# Patient Record
Sex: FEMALE | Race: WHITE | NOT HISPANIC OR LATINO | Employment: FULL TIME | URBAN - METROPOLITAN AREA
[De-identification: names, ages, dates, MRNs, and addresses within clinical notes are randomized per-mention and may not be internally consistent; named-entity substitution may affect disease eponyms.]

---

## 2017-08-26 ENCOUNTER — OFFICE VISIT (OUTPATIENT)
Dept: URGENT CARE | Facility: CLINIC | Age: 52
End: 2017-08-26
Payer: COMMERCIAL

## 2017-08-26 VITALS
DIASTOLIC BLOOD PRESSURE: 68 MMHG | TEMPERATURE: 97 F | RESPIRATION RATE: 18 BRPM | OXYGEN SATURATION: 98 % | HEIGHT: 67 IN | BODY MASS INDEX: 25.11 KG/M2 | WEIGHT: 160 LBS | HEART RATE: 90 BPM | SYSTOLIC BLOOD PRESSURE: 144 MMHG

## 2017-08-26 DIAGNOSIS — S99.922A FOOT INJURY, LEFT, INITIAL ENCOUNTER: Primary | ICD-10-CM

## 2017-08-26 PROCEDURE — 99203 OFFICE O/P NEW LOW 30 MIN: CPT | Mod: S$GLB,,, | Performed by: NURSE PRACTITIONER

## 2017-08-26 PROCEDURE — 3008F BODY MASS INDEX DOCD: CPT | Mod: S$GLB,,, | Performed by: NURSE PRACTITIONER

## 2017-08-26 NOTE — PATIENT INSTRUCTIONS
[facility] or [facility] for Post op/Cast shoe.    Crush Injury of the Foot, No Fracture  A crush injury to your foot causes local pain, swelling, and sometimes bruising. There are no broken bones. This injury takes from a few days to a few weeks to heal. If the toenail has been severely injured, it may fall off in 1 to 2 weeks. A new one will usually start to grow back within a month.  Home care  The following guidelines will help you care for your wound at home:  · You may be given a splint, cast, shoe, or boot to prevent movement at the injury. Unless you were told otherwise, use crutches or a walker and dont bear weight on the injured foot until cleared by your doctor to do so. (Crutches and walkers can be rented at many pharmacies and surgical/orthopedic supply stores). Dont put weight on a splint, or it will break.  · Keep your leg elevated to reduce pain and swelling. When sleeping, place a pillow under the injured leg. When sitting, support the injured leg so it is level with your waist. This is very important during the first 2 days (48 hours).  · Put an ice pack on the injured area. Do this for 20 minutes every 1 to 2 hours the first day for pain relief. You can make an ice pack by wrapping a plastic bag of ice cubes in a thin towel. As the ice melts, be careful that the splint/cast/boot/shoe doesnt get wet. Continue using the ice pack 3 to 4 times a day until the pain and swelling go away.  · You may use acetaminophen or ibuprofen to control pain, unless another pain medicine was prescribed. If you have chronic liver or kidney disease, talk with your health care provider before using these medicines. Also talk with your provider if youve had a stomach ulcer or GI bleeding.  · Keep the splint/cast/boot/shoe dry. When bathing, protect it with a large plastic bag, rubber-banded at the top end. If a fiberglass splint/cast or boot gets wet, you can dry it with a hair dryer. Unless told otherwise, you can  take off the boot or shoe to bathe.  · If your injury includes exposed cuts or scrapes, clean these daily with soap and water. Apply antibiotic ointment. Watch for the signs of infection listed below.  Follow-up care  Follow up with your doctor, or as advised. Return sooner if you dont start to get better within the next 3 days. If you were given a splint, it may be changed to a cast or boot at your follow-up visit.  X-rays will be checked by a radiologist. You will be told of any new findings that may affect your care.  When to seek medical advice  Call your health care provider right away if any of the following occur:  · The cast cracks  · The plaster cast or splint becomes wet or soft  · The fiberglass cast or splint remains wet for more than 24 hours  ·  Bad odor from the cast or wound-fluid stains the cast  · Increased tightness or pain under the cast or splint  · Toes become swollen, cold, blue, numb, or tingly  · Redness, warmth, swelling, drainage from the wound, or foul odor from a cast or splint  · You cant move your toes  · The skin around the cast becomes red  · Fever of 101ºF (38.3ºC) or higher, or as directed by your health care provider  Date Last Reviewed: 2/15/2015  © 7807-2519 [email or web address]. [address] [place] [address], [place] [address]. All rights reserved. This information is not intended as a substitute for professional medical care. Always follow your healthcare professional's instructions.

## 2017-08-26 NOTE — PROGRESS NOTES
"Subjective:       Patient ID: Nette Davidson is a 52 y.o. female.    Vitals:  height is 5' 7" (1.702 m) and weight is 72.6 kg (160 lb). Her temperature is 97.4 °F (36.3 °C). Her blood pressure is 144/68 (abnormal) and her pulse is 90. Her respiration is 18 and oxygen saturation is 98%.     Chief Complaint: Foot Pain    C/o pain and swelling over L foot with now bruising extending into toes after dropping a brass antique bowl onto foot approx 4 days ago.  Travels for work and had to fly with it.  Taking anti inflammatories otc for this.  Also taking Nucynta for chronic back pain, not here for this.      Foot Pain   This is a new problem. Episode onset: Pt dropped a heavy bowl on top of left foot four days ago. The problem occurs constantly. The problem has been gradually worsening. Pertinent negatives include no abdominal pain, chest pain, neck pain, numbness or weakness. The symptoms are aggravated by bending, standing and walking. She has tried ice for the symptoms. The treatment provided mild relief.     Review of Systems   Constitution: Negative for weakness and malaise/fatigue.   HENT: Negative.  Negative for nosebleeds.    Cardiovascular: Negative.  Negative for chest pain and syncope.   Respiratory: Negative for shortness of breath.    Endocrine: Negative.    Musculoskeletal: Negative for back pain and neck pain.        Pain to L foot   Gastrointestinal: Negative for abdominal pain.   Genitourinary: Negative for hematuria.   Neurological: Negative for dizziness and numbness.       Objective:      Physical Exam   Constitutional: She is oriented to person, place, and time. She appears well-developed and well-nourished.   HENT:   Head: Normocephalic and atraumatic. Head is without abrasion, without contusion and without laceration.   Right Ear: External ear normal.   Left Ear: External ear normal.   Nose: Nose normal.   Mouth/Throat: Oropharynx is clear and moist.   Eyes: Conjunctivae, EOM and lids are " normal. Pupils are equal, round, and reactive to light.   Neck: Trachea normal, full passive range of motion without pain and phonation normal. Neck supple.   Cardiovascular: Normal rate, regular rhythm and normal heart sounds.    Pulmonary/Chest: Effort normal and breath sounds normal. No stridor. No respiratory distress.   Musculoskeletal: Normal range of motion.        Left ankle: Normal.        Left lower leg: Normal.        Left foot: There is tenderness, bony tenderness and swelling. There is normal range of motion, normal capillary refill, no crepitus, no deformity and no laceration.   Pain to L foot mainly over 2nd metatarsal bone.  Bruising to dorsal portion of foot extending into toes.  Full ROM to foot and toes and ankle.   Neurological: She is alert and oriented to person, place, and time.   Skin: Skin is warm, dry and intact. Capillary refill takes less than 2 seconds. No abrasion, no bruising, no burn, no ecchymosis, no laceration, no lesion and no rash noted. No erythema.   Psychiatric: She has a normal mood and affect. Her speech is normal and behavior is normal. Judgment and thought content normal. Cognition and memory are normal.   Nursing note and vitals reviewed.      A L foot X-Ray was ordered. My reading of this film is no acute fracture. Radiologist interpretation below:    Left foot, 3 views    Comparison: None.    Findings: No fracture or dislocation.  Lisfranc articulation is congruent.  There is mild hallux valgus with mild degenerative changes.  There is soft tissue swelling of the forefoot.      Impression      As above.      Electronically signed by: MARCEL SPRING MD  Date: 08/26/17  Time: 16:28        Assessment:       1. Foot injury, left, initial encounter        Plan:         Foot injury, left, initial encounter  -     X-Ray Foot Complete Left; Future; Expected date: 08/26/2017    Post op or cast shoe.  Continue with anti inflammatories.  Keep elevated.  Compression stockings on  flights if needed.

## 2017-08-28 ENCOUNTER — TELEPHONE (OUTPATIENT)
Dept: URGENT CARE | Facility: CLINIC | Age: 52
End: 2017-08-28

## 2019-11-26 ENCOUNTER — OFFICE VISIT (OUTPATIENT)
Dept: URGENT CARE | Facility: CLINIC | Age: 54
End: 2019-11-26
Payer: COMMERCIAL

## 2019-11-26 VITALS
WEIGHT: 160 LBS | TEMPERATURE: 98 F | SYSTOLIC BLOOD PRESSURE: 130 MMHG | RESPIRATION RATE: 12 BRPM | HEIGHT: 67 IN | HEART RATE: 95 BPM | DIASTOLIC BLOOD PRESSURE: 82 MMHG | BODY MASS INDEX: 25.11 KG/M2 | OXYGEN SATURATION: 97 %

## 2019-11-26 DIAGNOSIS — J06.9 VIRAL URI WITH COUGH: Primary | ICD-10-CM

## 2019-11-26 PROCEDURE — 99214 OFFICE O/P EST MOD 30 MIN: CPT | Mod: S$GLB,,, | Performed by: PHYSICIAN ASSISTANT

## 2019-11-26 PROCEDURE — 99214 PR OFFICE/OUTPT VISIT, EST, LEVL IV, 30-39 MIN: ICD-10-PCS | Mod: S$GLB,,, | Performed by: PHYSICIAN ASSISTANT

## 2019-11-26 RX ORDER — PREDNISONE 20 MG/1
TABLET ORAL
Qty: 6 TABLET | Refills: 0 | Status: SHIPPED | OUTPATIENT
Start: 2019-11-26

## 2019-11-26 RX ORDER — BENZONATATE 100 MG/1
200 CAPSULE ORAL 3 TIMES DAILY PRN
Qty: 20 CAPSULE | Refills: 0 | Status: SHIPPED | OUTPATIENT
Start: 2019-11-26

## 2019-11-26 RX ORDER — PROMETHAZINE HYDROCHLORIDE AND DEXTROMETHORPHAN HYDROBROMIDE 6.25; 15 MG/5ML; MG/5ML
5 SYRUP ORAL NIGHTLY PRN
Qty: 40 ML | Refills: 0 | Status: SHIPPED | OUTPATIENT
Start: 2019-11-26 | End: 2019-12-06

## 2019-11-26 NOTE — PROGRESS NOTES
"Subjective:       Patient ID: Nette Davidson is a 54 y.o. female.    Vitals:  height is 5' 7" (1.702 m) and weight is 72.6 kg (160 lb). Her oral temperature is 98.1 °F (36.7 °C). Her blood pressure is 130/82 and her pulse is 95. Her respiration is 12 and oxygen saturation is 97%.     Chief Complaint: Sore Throat    Pt c/o persistent cough, voice change,  postnasal drip, x 3 days. Denies sinus pressure, fever/chills, wheezing, sob. She has been taking Tylenol with no relief.     Sore Throat    This is a new problem. The current episode started in the past 7 days. The problem has been gradually worsening. Neither side of throat is experiencing more pain than the other. There has been no fever. The pain is at a severity of 7/10. The pain is mild. Associated symptoms include congestion, coughing and a hoarse voice. Pertinent negatives include no ear pain, shortness of breath, stridor or vomiting. She has had no exposure to strep or mono. She has tried oral narcotic analgesics for the symptoms.   URI    This is a new problem. The current episode started in the past 7 days. The problem has been unchanged. There has been no fever. Associated symptoms include congestion, coughing and a sore throat. Pertinent negatives include no ear pain, nausea, rash, sinus pain, vomiting or wheezing. She has tried nothing for the symptoms. The treatment provided mild relief.       Constitution: Negative for chills, sweating, fatigue and fever.   HENT: Positive for congestion, postnasal drip, sinus pressure, sore throat and voice change. Negative for ear pain and sinus pain.    Neck: Negative for painful lymph nodes.   Eyes: Negative for eye redness.   Respiratory: Positive for cough. Negative for chest tightness, sputum production, bloody sputum, COPD, shortness of breath, stridor, wheezing and asthma.    Gastrointestinal: Negative for nausea and vomiting.   Musculoskeletal: Negative for muscle ache.   Skin: Negative for rash. "   Allergic/Immunologic: Negative for seasonal allergies and asthma.   Hematologic/Lymphatic: Negative for swollen lymph nodes.       Objective:      Physical Exam   Constitutional: She is oriented to person, place, and time. She appears well-developed and well-nourished. She is cooperative.  Non-toxic appearance. She does not have a sickly appearance. She does not appear ill. No distress.   HENT:   Head: Normocephalic and atraumatic.   Right Ear: Hearing, tympanic membrane, external ear and ear canal normal.   Left Ear: Hearing, tympanic membrane, external ear and ear canal normal.   Nose: Nose normal. No mucosal edema, rhinorrhea or nasal deformity. No epistaxis. Right sinus exhibits no maxillary sinus tenderness and no frontal sinus tenderness. Left sinus exhibits no maxillary sinus tenderness and no frontal sinus tenderness.   Mouth/Throat: Uvula is midline and mucous membranes are normal. No trismus in the jaw. Normal dentition. No uvula swelling. Posterior oropharyngeal erythema and cobblestoning present. No oropharyngeal exudate or posterior oropharyngeal edema.   Hoarse voice   Eyes: Conjunctivae and lids are normal. No scleral icterus.   Neck: Trachea normal, full passive range of motion without pain and phonation normal. Neck supple. No neck rigidity. No edema and no erythema present.   Cardiovascular: Normal rate, regular rhythm, normal heart sounds, intact distal pulses and normal pulses.   Pulmonary/Chest: Effort normal and breath sounds normal. No respiratory distress. She has no decreased breath sounds. She has no rhonchi.   Abdominal: Normal appearance.   Musculoskeletal: Normal range of motion. She exhibits no edema or deformity.   Neurological: She is alert and oriented to person, place, and time. She exhibits normal muscle tone. Coordination normal.   Skin: Skin is warm, dry, intact, not diaphoretic and not pale.   Psychiatric: She has a normal mood and affect. Her speech is normal and behavior is  normal. Judgment and thought content normal. Cognition and memory are normal.   Nursing note and vitals reviewed.        Assessment:       1. Viral URI with cough        Plan:         Viral URI with cough  -     predniSONE (DELTASONE) 20 MG tablet; Take 1 tablet (20 mg) by mouth twice daily for 2 days. Then take 1 tablet (20 mg) once daily for 2 days.  Dispense: 6 tablet; Refill: 0  -     benzonatate (TESSALON PERLES) 100 MG capsule; Take 2 capsules (200 mg total) by mouth 3 (three) times daily as needed for Cough.  Dispense: 20 capsule; Refill: 0  -     promethazine-dextromethorphan (PROMETHAZINE-DM) 6.25-15 mg/5 mL Syrp; Take 5 mLs by mouth nightly as needed (cough).  Dispense: 40 mL; Refill: 0      Patient Instructions     Viral Upper Respiratory Illness (Adult)  You have a viral upper respiratory illness (URI), which is another term for the common cold. This illness is contagious during the first few days. It is spread through the air by coughing and sneezing. It may also be spread by direct contact (touching the sick person and then touching your own eyes, nose, or mouth). Frequent handwashing will decrease risk of spread. Most viral illnesses go away within 7 to 10 days with rest and simple home remedies. Sometimes the illness may last for several weeks. Antibiotics will not kill a virus, and they are generally not prescribed for this condition.    Home care  · If symptoms are severe, rest at home for the first 2 to 3 days. When you resume activity, don't let yourself get too tired.  · Avoid being exposed to cigarette smoke (yours or others).  · You may use acetaminophen or ibuprofen to control pain and fever, unless another medicine was prescribed. (Note: If you have chronic liver or kidney disease, have ever had a stomach ulcer or gastrointestinal bleeding, or are taking blood-thinning medicines, talk with your healthcare provider before using these medicines.) Aspirin should never be given to anyone under  18 years of age who is ill with a viral infection or fever. It may cause severe liver or brain damage.  · Your appetite may be poor, so a light diet is fine. Avoid dehydration by drinking 6 to 8 glasses of fluids per day (water, soft drinks, juices, tea, or soup). Extra fluids will help loosen secretions in the nose and lungs.  · Over-the-counter cold medicines will not shorten the length of time youre sick, but they may be helpful for the following symptoms: cough, sore throat, and nasal and sinus congestion. (Note: Do not use decongestants if you have high blood pressure.)  Follow-up care  Follow up with your healthcare provider, or as advised.  When to seek medical advice  Call your healthcare provider right away if any of these occur:  · Cough with lots of colored sputum (mucus)  · Severe headache; face, neck, or ear pain  · Difficulty swallowing due to throat pain  · Fever of 100.4°F (38°C)  Call 911, or get immediate medical care  Call emergency services right away if any of these occur:  · Chest pain, shortness of breath, wheezing, or difficulty breathing  · Coughing up blood  · Inability to swallow due to throat pain  Date Last Reviewed: 9/13/2015  © 5776-2628 [employer]. [address] [place] [address], [place] [address]. All rights reserved. This information is not intended as a substitute for professional medical care. Always follow your healthcare professional's instructions.      Please follow up with your Primary care provider within 2-5 days if your signs and symptoms have not resolved or worsen.     If your condition worsens or fails to improve we recommend that you receive another evaluation at the emergency room immediately or contact your primary medical clinic to discuss your concerns.   You must understand that you have received an Urgent Care treatment only and that you may be released before all of your medical problems are known or treated. You, the patient, will arrange for follow up  care as instructed.     RED FLAGS/WARNING SYMPTOMS DISCUSSED WITH PATIENT THAT WOULD WARRANT EMERGENT MEDICAL ATTENTION. PATIENT VERBALIZED UNDERSTANDING.

## 2019-11-26 NOTE — PATIENT INSTRUCTIONS
Viral Upper Respiratory Illness (Adult)  You have a viral upper respiratory illness (URI), which is another term for the common cold. This illness is contagious during the first few days. It is spread through the air by coughing and sneezing. It may also be spread by direct contact (touching the sick person and then touching your own eyes, nose, or mouth). Frequent handwashing will decrease risk of spread. Most viral illnesses go away within 7 to 10 days with rest and simple home remedies. Sometimes the illness may last for several weeks. Antibiotics will not kill a virus, and they are generally not prescribed for this condition.    Home care  · If symptoms are severe, rest at home for the first 2 to 3 days. When you resume activity, don't let yourself get too tired.  · Avoid being exposed to cigarette smoke (yours or others).  · You may use acetaminophen or ibuprofen to control pain and fever, unless another medicine was prescribed. (Note: If you have chronic liver or kidney disease, have ever had a stomach ulcer or gastrointestinal bleeding, or are taking blood-thinning medicines, talk with your healthcare provider before using these medicines.) Aspirin should never be given to anyone under 18 years of age who is ill with a viral infection or fever. It may cause severe liver or brain damage.  · Your appetite may be poor, so a light diet is fine. Avoid dehydration by drinking 6 to 8 glasses of fluids per day (water, soft drinks, juices, tea, or soup). Extra fluids will help loosen secretions in the nose and lungs.  · Over-the-counter cold medicines will not shorten the length of time youre sick, but they may be helpful for the following symptoms: cough, sore throat, and nasal and sinus congestion. (Note: Do not use decongestants if you have high blood pressure.)  Follow-up care  Follow up with your healthcare provider, or as advised.  When to seek medical advice  Call your healthcare provider right away if any  of these occur:  · Cough with lots of colored sputum (mucus)  · Severe headache; face, neck, or ear pain  · Difficulty swallowing due to throat pain  · Fever of 100.4°F (38°C)  Call 911, or get immediate medical care  Call emergency services right away if any of these occur:  · Chest pain, shortness of breath, wheezing, or difficulty breathing  · Coughing up blood  · Inability to swallow due to throat pain  Date Last Reviewed: 9/13/2015  © 4773-4785 [employer]. [address] [place] [address]. All rights reserved. This information is not intended as a substitute for professional medical care. Always follow your healthcare professional's instructions.      Please follow up with your Primary care provider within 2-5 days if your signs and symptoms have not resolved or worsen.     If your condition worsens or fails to improve we recommend that you receive another evaluation at the emergency room immediately or contact your primary medical clinic to discuss your concerns.   You must understand that you have received an Urgent Care treatment only and that you may be released before all of your medical problems are known or treated. You, the patient, will arrange for follow up care as instructed.     RED FLAGS/WARNING SYMPTOMS DISCUSSED WITH PATIENT THAT WOULD WARRANT EMERGENT MEDICAL ATTENTION. PATIENT VERBALIZED UNDERSTANDING.

## 2019-12-02 ENCOUNTER — OFFICE VISIT (OUTPATIENT)
Dept: URGENT CARE | Facility: CLINIC | Age: 54
End: 2019-12-02
Payer: COMMERCIAL

## 2019-12-02 VITALS
HEIGHT: 67 IN | RESPIRATION RATE: 19 BRPM | SYSTOLIC BLOOD PRESSURE: 146 MMHG | OXYGEN SATURATION: 98 % | HEART RATE: 111 BPM | BODY MASS INDEX: 26.68 KG/M2 | TEMPERATURE: 97 F | WEIGHT: 170 LBS | DIASTOLIC BLOOD PRESSURE: 87 MMHG

## 2019-12-02 DIAGNOSIS — J04.0 LARYNGITIS: Primary | ICD-10-CM

## 2019-12-02 DIAGNOSIS — R05.9 COUGH: ICD-10-CM

## 2019-12-02 PROCEDURE — 99214 PR OFFICE/OUTPT VISIT, EST, LEVL IV, 30-39 MIN: ICD-10-PCS | Mod: S$GLB,,, | Performed by: NURSE PRACTITIONER

## 2019-12-02 PROCEDURE — 99214 OFFICE O/P EST MOD 30 MIN: CPT | Mod: S$GLB,,, | Performed by: NURSE PRACTITIONER

## 2019-12-02 RX ORDER — PROMETHAZINE HYDROCHLORIDE AND DEXTROMETHORPHAN HYDROBROMIDE 6.25; 15 MG/5ML; MG/5ML
5 SYRUP ORAL NIGHTLY PRN
Qty: 180 ML | Refills: 0 | Status: SHIPPED | OUTPATIENT
Start: 2019-12-02 | End: 2019-12-12

## 2019-12-02 RX ORDER — METHYLPREDNISOLONE 4 MG/1
4 TABLET ORAL
Qty: 1 PACKAGE | Refills: 0 | Status: SHIPPED | OUTPATIENT
Start: 2019-12-02 | End: 2019-12-08

## 2019-12-02 NOTE — PATIENT INSTRUCTIONS
You must understand that you've received an Urgent Care treatment only and that you may be released before all your medical problems are known or treated. You, the patient, will arrange for follow up care as instructed.  If your condition worsens we recommend that you receive another evaluation at the emergency room immediately or contact your primary medical clinics after hours call service to discuss your concerns.  Please return here or go to the Emergency Department for any concerns or worsening of condition.        Laryngitis    Laryngitis is a swelling of the tissues around the vocal cords. Symptoms include a hoarse (scratchy) voice. The voice may be lost completely. It may be caused by a viral illness, such as a head or chest cold. It may also be due to overuse and strain of the voice. Smoking, drinking alcohol, acid reflux, allergies, or inhaling harsh chemicals may also lead to symptoms. This condition will usually resolve in 1-2 weeks.  Home care  · Rest your voice until it recovers. Talk as little as possible. If your symptoms are severe, rest at home for a day or so.  · Breathing cool steam from a humidifier/vaporizer or in a steamy shower may be helpful.  · Drink plenty of fluids to stay well hydrated.  · Do not smoke  Follow-up care  Follow up with your healthcare provider or this facility if you have not improved after one week.  When to seek medical advice  Contact your healthcare provider for any of the following:  · Severe pain with swallowing  · Trouble opening mouth  · Neck swelling, neck pain, or trouble moving neck  · Noisy breathing or trouble breathing  · Fever of 100.4°F (38.ºC) or higher, or as directed by your healthcare provider  · Drooling  · Symptoms do not resolve in 2 weeks  Date Last Reviewed: 4/26/2015  © 2766-0212 [employer]. [address] [place] [address], [place] [address]. All rights reserved. This information is not intended as a substitute for professional medical  care. Always follow your healthcare professional's instructions.

## 2019-12-02 NOTE — PROGRESS NOTES
"Subjective:       Patient ID: Nette Davidson is a 54 y.o. female.    Vitals:  height is 5' 7" (1.702 m) and weight is 77.1 kg (170 lb). Her oral temperature is 97.3 °F (36.3 °C). Her blood pressure is 146/87 (abnormal) and her pulse is 111 (abnormal). Her respiration is 19 and oxygen saturation is 98%.     Chief Complaint: Cough    Patient's symptoms started on 11/26, was seen on 11/26 was given steroid shot and oral steroids. Patient states she has been having lingering cough and hoarseness. No difficultly breathing or shortness of breath.     Cough   This is a new problem. The current episode started 1 to 4 weeks ago (november 22nd). The problem has been unchanged. The problem occurs hourly. The cough is productive of sputum. Associated symptoms include nasal congestion and rhinorrhea. Pertinent negatives include no chest pain, chills, ear congestion, ear pain, eye redness, fever, headaches, heartburn, hemoptysis, myalgias, postnasal drip, rash, sore throat, shortness of breath, sweats, weight loss or wheezing. Nothing aggravates the symptoms. Treatments tried: steroids  The treatment provided mild relief.       Constitution: Negative for chills, sweating, fatigue and fever.   HENT: Negative for ear pain, congestion, postnasal drip, sinus pain, sinus pressure, sore throat and voice change.    Neck: Negative for painful lymph nodes.   Cardiovascular: Negative for chest pain.   Eyes: Negative for eye redness.   Respiratory: Negative for chest tightness, cough, sputum production, bloody sputum, COPD, shortness of breath, stridor, wheezing and asthma.    Gastrointestinal: Negative for nausea, vomiting and heartburn.   Musculoskeletal: Negative for muscle ache.   Skin: Negative for rash.   Allergic/Immunologic: Negative for seasonal allergies and asthma.   Neurological: Negative for headaches.   Hematologic/Lymphatic: Negative for swollen lymph nodes.       Objective:      Physical Exam   Constitutional: She is " oriented to person, place, and time. She appears well-developed and well-nourished. She is cooperative.  Non-toxic appearance. She does not have a sickly appearance. She does not appear ill. No distress.   Hoarseness noted  Does not appear acutely ill   HENT:   Head: Normocephalic and atraumatic.   Right Ear: Hearing, tympanic membrane, external ear and ear canal normal.   Left Ear: Hearing, tympanic membrane, external ear and ear canal normal.   Nose: Nose normal. No mucosal edema, rhinorrhea or nasal deformity. No epistaxis. Right sinus exhibits no maxillary sinus tenderness and no frontal sinus tenderness. Left sinus exhibits no maxillary sinus tenderness and no frontal sinus tenderness.   Mouth/Throat: Uvula is midline, oropharynx is clear and moist and mucous membranes are normal. No trismus in the jaw. Normal dentition. No uvula swelling. No oropharyngeal exudate, posterior oropharyngeal edema or posterior oropharyngeal erythema.   Eyes: Conjunctivae and lids are normal. No scleral icterus.   Neck: Trachea normal, full passive range of motion without pain and phonation normal. Neck supple. No neck rigidity. No edema and no erythema present.   Cardiovascular: Regular rhythm, normal heart sounds, intact distal pulses and normal pulses. Tachycardia present.   Pulmonary/Chest: Effort normal and breath sounds normal. No respiratory distress. She has no decreased breath sounds. She has no rhonchi.   Dry cough   Abdominal: Normal appearance.   Musculoskeletal: Normal range of motion. She exhibits no edema or deformity.   Neurological: She is alert and oriented to person, place, and time. She exhibits normal muscle tone. Coordination normal.   Skin: Skin is warm, dry, intact, not diaphoretic and not pale.   Psychiatric: She has a normal mood and affect. Her speech is normal and behavior is normal. Judgment and thought content normal. Cognition and memory are normal.   Nursing note and vitals reviewed.         Assessment:       1. Laryngitis    2. Cough        Plan:         Laryngitis    Cough    Other orders  -     methylPREDNISolone (MEDROL DOSEPACK) 4 mg tablet; Take 1 tablet (4 mg total) by mouth as needed (Take as directed). Take as directed  Dispense: 1 Package; Refill: 0  -     promethazine-dextromethorphan (PROMETHAZINE-DM) 6.25-15 mg/5 mL Syrp; Take 5 mLs by mouth nightly as needed.  Dispense: 180 mL; Refill: 0           Patient Instructions     You must understand that you've received an Urgent Care treatment only and that you may be released before all your medical problems are known or treated. You, the patient, will arrange for follow up care as instructed.  If your condition worsens we recommend that you receive another evaluation at the emergency room immediately or contact your primary medical clinics after hours call service to discuss your concerns.  Please return here or go to the Emergency Department for any concerns or worsening of condition.        Laryngitis    Laryngitis is a swelling of the tissues around the vocal cords. Symptoms include a hoarse (scratchy) voice. The voice may be lost completely. It may be caused by a viral illness, such as a head or chest cold. It may also be due to overuse and strain of the voice. Smoking, drinking alcohol, acid reflux, allergies, or inhaling harsh chemicals may also lead to symptoms. This condition will usually resolve in 1-2 weeks.  Home care  · Rest your voice until it recovers. Talk as little as possible. If your symptoms are severe, rest at home for a day or so.  · Breathing cool steam from a humidifier/vaporizer or in a steamy shower may be helpful.  · Drink plenty of fluids to stay well hydrated.  · Do not smoke  Follow-up care  Follow up with your healthcare provider or this facility if you have not improved after one week.  When to seek medical advice  Contact your healthcare provider for any of the following:  · Severe pain with  swallowing  · Trouble opening mouth  · Neck swelling, neck pain, or trouble moving neck  · Noisy breathing or trouble breathing  · Fever of 100.4°F (38.ºC) or higher, or as directed by your healthcare provider  · Drooling  · Symptoms do not resolve in 2 weeks  Date Last Reviewed: 4/26/2015  © 6730-2517 [employer]. [address] [place], [place] [id number]. All rights reserved. This information is not intended as a substitute for professional medical care. Always follow your healthcare professional's instructions.